# Patient Record
Sex: FEMALE | Race: WHITE | NOT HISPANIC OR LATINO | Employment: OTHER | URBAN - METROPOLITAN AREA
[De-identification: names, ages, dates, MRNs, and addresses within clinical notes are randomized per-mention and may not be internally consistent; named-entity substitution may affect disease eponyms.]

---

## 2018-01-10 NOTE — RESULT NOTES
Verified Results  (1) THIN PREP PAP WITH IMAGING [id number] Harriett Jefferson Order Number: [id number]     Test Name Result Flag Reference   LAB AP CASE REPORT (Report)     Gynecologic Cytology Report            Case: [id number]                  Authorizing Provider: ARIELA Elena    Collected:      12/08/2016 1028        First Screen:     TRACI Chavez    Received:      12/12/2016 1123        Specimen:  LIQUID-BASED PAP, SCREENING, Cervix, Endocervical   LAB AP GYN PRIMARY INTERPRETATION      Negative for intraepithelial lesion or malignancy  Electronically signed by TRACI Chavez on 12/13/2016 at 5:38 PM   LAB AP GYN SPECIMEN ADEQUACY      Satisfactory for evaluation  Endocervical/transformation zone component present  LAB AP GYN ADDITIONAL INFORMATION (Report)     [facility] FDA approved ,  and ThinPrep Imaging System are   utilized with strict adherence to the 's instruction manual to   prepare gynecologic and non-gynecologic cytology specimens for the   production of ThinPrep slides as well as for gynecologic ThinPrep imaging  These processes have been validated by our laboratory and/or by the     The Pap test is not a diagnostic procedure and should not be used as the   sole means to detect cervical cancer  It is only a screening procedure to   aid in the detection of cervical cancer and its precursors  Both   false-negative and false-positive results have been experienced  Your   patient's test result should be interpreted in this context together with   the history and clinical findings     LAB AP LMP      Performing Comments: CERVICAL - ENDOCERVICAL  ROUTINE PAP TEST   Performing Comments: CERVICAL - ENDOCERVICAL  ROUTINE PAP TEST

## 2018-02-26 ENCOUNTER — OFFICE VISIT (OUTPATIENT)
Dept: GYNECOLOGY | Facility: CLINIC | Age: 68
End: 2018-02-26
Payer: MEDICARE

## 2018-02-26 VITALS
WEIGHT: 154 LBS | BODY MASS INDEX: 27.29 KG/M2 | HEIGHT: 63 IN | DIASTOLIC BLOOD PRESSURE: 84 MMHG | SYSTOLIC BLOOD PRESSURE: 114 MMHG

## 2018-02-26 DIAGNOSIS — Z01.419 ENCOUNTER FOR GYNECOLOGICAL EXAMINATION WITHOUT ABNORMAL FINDING: Primary | ICD-10-CM

## 2018-02-26 DIAGNOSIS — Z78.0 MENOPAUSE: ICD-10-CM

## 2018-02-26 DIAGNOSIS — Z12.31 ENCOUNTER FOR SCREENING MAMMOGRAM FOR MALIGNANT NEOPLASM OF BREAST: ICD-10-CM

## 2018-02-26 PROCEDURE — G0101 CA SCREEN;PELVIC/BREAST EXAM: HCPCS | Performed by: NURSE PRACTITIONER

## 2018-02-26 RX ORDER — ZOLPIDEM TARTRATE 10 MG/1
1 TABLET ORAL
COMMUNITY
End: 2018-02-26 | Stop reason: SDUPTHER

## 2018-02-26 RX ORDER — ZOLPIDEM TARTRATE 10 MG/1
10 TABLET ORAL
Qty: 30 TABLET | Refills: 3 | Status: SHIPPED | OUTPATIENT
Start: 2018-02-26

## 2018-02-26 NOTE — PROGRESS NOTES
George Mills is a 69 y o  female who presents for annual exam  The patient is post menopausal and voices no complaints today  The patient is sexually active  GYN screening history: last pap: was normal and last mammogram: was normal  The patient is not taking hormone replacement therapy  Patient denies post-menopausal vaginal bleeding      The patient participates in regular exercise: yes         History of abnormal Pap smear: no  Family history of breast cancer: no  Past Medical History:   Diagnosis Date    Menopause     Osteoporosis      Family History   Problem Relation Age of Onset    Arthritis Mother     Diabetes Mother     Arthritis Father     Diabetes Father     Cataracts Brother      Past Surgical History:   Procedure Laterality Date    COLONOSCOPY      TONSILLECTOMY      TOOTH EXTRACTION       Social History     Social History    Marital status: /Civil Union     Spouse name: N/A    Number of children: N/A    Years of education: N/A     Occupational History    Not on file       Social History Main Topics    Smoking status: Never Smoker    Smokeless tobacco: Never Used    Alcohol use Yes      Comment: 2-3 times matthew    Drug use: No    Sexual activity: No     Other Topics Concern    Not on file     Social History Narrative    No narrative on file       Current Outpatient Prescriptions:     Multiple Vitamin (MULTI VITAMIN DAILY PO), Take by mouth, Disp: , Rfl:     zolpidem (AMBIEN) 10 mg tablet, Take 1 tablet (10 mg total) by mouth daily at bedtime as needed for sleep, Disp: 30 tablet, Rfl: 3      Review of Systems  Constitutional :no fever, feels well, no tiredness, no recent weight gain or loss  Cardiovascular: no complaints of slow or fast heart beat, no chest pain, no palpitations  Respiratory: no complaints of shortness of shortness of breath, no SCHREIBER  Breasts:no complaints of breast pain, breast lump, or nipple discharge  Gastrointestinal: no complaints of abdominal pain, constipation,nause, vomiting, or diarrhea or bloody stools  Genitourinary : no complaints of dysuria, incontinence, pelvic pain, dysmenorrhea,vaginal discharge or abnormal vaginal bleeding  Integumentary: no complaints of skin rash or lesion,itching or dry skin  Neurological: no complaints of headache,numbness, tingling, dizziness or fainting       Objective      /84 (BP Location: Right arm, Patient Position: Sitting, Cuff Size: Standard)   Ht 5' 2 5" (1 588 m)   Wt 69 9 kg (154 lb)   BMI 27 72 kg/m²     General:   appears stated age","cooperative","alert" normal mood and affect   Neck: Neck: normal, supple,trachea midline, no masses   Heart: regular rate and rhythm, S1, S2 normal, no murmur, click, rub or gallop   Lungs: clear to auscultation bilaterally"   Breasts: Breast exam :normal, no dimpling or skin changes noted   Abdomen: soft, non-tender, without masses or organomegaly   Vulva: Normal , no lesion   Vagina: normal , no lesions or dryness  Mild atrophy   Urethra: normal   Cervix: Normal, no palpable masses A pap smear was not done   Uterus: Normal , non-tender,not enlarged,no palpable masses   Adnexa: Normal, non-tender without fullness or masses                         Assessment     Normal GYN exam     Plan      All questions answered  Breast self exam technique reviewed and patient encouraged to perform self-exam monthly  Dietary diary  Dexa scan due 12/2018    Colonoscopy due this year

## 2018-09-14 DIAGNOSIS — R39.9 URINARY TRACT INFECTION SYMPTOMS: Primary | ICD-10-CM

## 2018-09-14 RX ORDER — NITROFURANTOIN 25; 75 MG/1; MG/1
100 CAPSULE ORAL 2 TIMES DAILY
Qty: 14 CAPSULE | Refills: 0 | Status: SHIPPED | OUTPATIENT
Start: 2018-09-14 | End: 2018-09-21

## 2019-03-08 DIAGNOSIS — Z12.31 ENCOUNTER FOR SCREENING MAMMOGRAM FOR MALIGNANT NEOPLASM OF BREAST: Primary | ICD-10-CM

## 2019-03-15 NOTE — PROGRESS NOTES
Assessment/Plan:    Benign findings on routine gyn exam  Recommended monthly SBE, annual CBE and annual screening mammo  DEXA scan script given  Discussed diagnosis of osteoporosis, risk of fracture and treatment with Prolia  Patient would like to wait for results of most DEXA before proceeding  Information sent to complete Cologuard  The patient denies STI risk factors and declines testing at this time  Reviewed diet/activity recommendations Calcium 2376-8178 mg and Vit D 800-1000 IU daily  Discussed postmenopausal considerations and symptoms to report  Discussed vaginal estrogen cream   Patient is going to think about it and notify the office if she would like to proceed  Diagnoses and all orders for this visit:    Encounter for gynecological examination (general) (routine) without abnormal findings    Encounter for screening for osteoporosis  -     DXA bone density spine hip and pelvis; Future    Menopause  -     DXA bone density spine hip and pelvis; Future        Subjective:      Patient ID: Wilfredo Montgomery is a 77 y o  female  This patient presents for routine annual gyn health maintenance exam   She denies acute gyn complaints  She denies  bleeding or spotting, VM sx, pelvic pain, dyspareunia except for occasional dryness  Denies breast concerns, abnormal discharge, bowel/bladder dysfunction   for 51 years and is sexually active  Denies STI concerns  No hx of STIs  Pap/HPV up to date and normal on 12/2016  Mammography up to date and normal - done at [facility]  2/26/18  Osteoporosis screening in 2016 revealed osteoporosis  Patient states she has declined treatment to date  Colonoscopy was 10 years ago per patient  She would like to proceed with Cologuard  She denies fam hx of colon cancer, inflammatory bowel disease or hx of colon polyps        The following portions of the patient's history were reviewed and updated as appropriate: allergies, current medications, past family history, past medical history, past social history, past surgical history and problem list     Review of Systems   Constitutional: Negative  Respiratory: Negative  Cardiovascular: Negative  Gastrointestinal: Negative  Endocrine: Negative  Genitourinary: Negative for dyspareunia (except for dryness), dysuria, frequency, pelvic pain, urgency, vaginal bleeding, vaginal discharge and vaginal pain  Musculoskeletal: Negative  Skin: Negative  Neurological: Negative  Psychiatric/Behavioral: Negative  Objective:      /90 (BP Location: Right arm)   Pulse 86   Ht 5' 2 5" (1 588 m)   Wt 69 9 kg (154 lb)   BMI 27 72 kg/m²          Physical Exam   Constitutional: She is oriented to person, place, and time  She appears well-developed and well-nourished  She is cooperative  HENT:   Head: Normocephalic and atraumatic  Neck: Normal range of motion  Neck supple  No thyroid mass and no thyromegaly present  Cardiovascular: Normal rate, regular rhythm and normal heart sounds  Pulmonary/Chest: Effort normal and breath sounds normal  Right breast exhibits no inverted nipple, no mass, no nipple discharge, no skin change and no tenderness  Left breast exhibits no inverted nipple, no mass, no nipple discharge, no skin change and no tenderness  No breast tenderness or discharge  Breasts are symmetrical    Abdominal: Soft  Normal appearance and bowel sounds are normal  There is no hepatosplenomegaly  There is no tenderness  Genitourinary: Vagina normal and uterus normal  No breast tenderness or discharge  Pelvic exam was performed with patient supine  There is no rash, tenderness or lesion on the right labia  There is no rash, tenderness or lesion on the left labia  Uterus is not enlarged and not tender  Cervix exhibits no motion tenderness, no discharge and no friability  Right adnexum displays no mass and no tenderness  Left adnexum displays no mass and no tenderness   No bleeding in the vagina  No vaginal discharge found  Genitourinary Comments: Mild vaginal atrophy   Musculoskeletal: Normal range of motion  Lymphadenopathy:     She has no axillary adenopathy  No inguinal adenopathy noted on the right or left side  Right: No inguinal adenopathy present  Left: No inguinal adenopathy present  Neurological: She is alert and oriented to person, place, and time  Skin: Skin is warm, dry and intact  Psychiatric: She has a normal mood and affect  Her speech is normal and behavior is normal  Cognition and memory are normal    Nursing note and vitals reviewed

## 2019-03-18 ENCOUNTER — ANNUAL EXAM (OUTPATIENT)
Dept: GYNECOLOGY | Facility: CLINIC | Age: 69
End: 2019-03-18
Payer: MEDICARE

## 2019-03-18 VITALS
HEIGHT: 63 IN | WEIGHT: 154 LBS | DIASTOLIC BLOOD PRESSURE: 90 MMHG | BODY MASS INDEX: 27.29 KG/M2 | HEART RATE: 86 BPM | SYSTOLIC BLOOD PRESSURE: 138 MMHG

## 2019-03-18 DIAGNOSIS — Z78.0 MENOPAUSE: ICD-10-CM

## 2019-03-18 DIAGNOSIS — Z13.820 ENCOUNTER FOR SCREENING FOR OSTEOPOROSIS: ICD-10-CM

## 2019-03-18 DIAGNOSIS — Z01.419 ENCOUNTER FOR GYNECOLOGICAL EXAMINATION (GENERAL) (ROUTINE) WITHOUT ABNORMAL FINDINGS: Primary | ICD-10-CM

## 2019-03-18 PROCEDURE — G0101 CA SCREEN;PELVIC/BREAST EXAM: HCPCS | Performed by: OBSTETRICS & GYNECOLOGY

## 2019-03-18 NOTE — PATIENT INSTRUCTIONS
Benign findings on routine gyn exam  Recommended monthly SBE, annual CBE and annual screening mammo  DEXA scan script given  Discussed diagnosis of osteoporosis, risk of fracture and treatment with Prolia  Patient would like to wait for results of most DEXA before proceeding  Information sent to complete Cologuard  The patient denies STI risk factors and declines testing at this time  Reviewed diet/activity recommendations Calcium 7186-5987 mg and Vit D 800-1000 IU daily  Discussed postmenopausal considerations and symptoms to report

## 2019-04-04 ENCOUNTER — TELEPHONE (OUTPATIENT)
Dept: GYNECOLOGY | Facility: CLINIC | Age: 69
End: 2019-04-04

## 2019-04-05 ENCOUNTER — TELEPHONE (OUTPATIENT)
Dept: GYNECOLOGY | Facility: CLINIC | Age: 69
End: 2019-04-05

## 2019-04-26 ENCOUNTER — TELEPHONE (OUTPATIENT)
Dept: GYNECOLOGY | Facility: CLINIC | Age: 69
End: 2019-04-26

## 2019-04-29 ENCOUNTER — TELEPHONE (OUTPATIENT)
Dept: GYNECOLOGY | Facility: CLINIC | Age: 69
End: 2019-04-29

## 2019-04-30 ENCOUNTER — TELEPHONE (OUTPATIENT)
Dept: GYNECOLOGY | Facility: CLINIC | Age: 69
End: 2019-04-30

## 2019-05-04 DIAGNOSIS — Z78.0 MENOPAUSE: ICD-10-CM

## 2019-05-04 DIAGNOSIS — Z13.820 ENCOUNTER FOR SCREENING FOR OSTEOPOROSIS: ICD-10-CM

## 2019-06-03 ENCOUNTER — EVALUATION (OUTPATIENT)
Dept: PHYSICAL THERAPY | Facility: CLINIC | Age: 69
End: 2019-06-03
Payer: MEDICARE

## 2019-06-03 DIAGNOSIS — M54.16 LUMBAR RADICULOPATHY: Primary | ICD-10-CM

## 2019-06-04 ENCOUNTER — TRANSCRIBE ORDERS (OUTPATIENT)
Dept: PHYSICAL THERAPY | Facility: CLINIC | Age: 69
End: 2019-06-04

## 2019-06-04 DIAGNOSIS — M54.16 LUMBAR RADICULOPATHY: Primary | ICD-10-CM

## 2019-06-04 PROCEDURE — 97161 PT EVAL LOW COMPLEX 20 MIN: CPT | Performed by: PHYSICAL THERAPIST

## 2019-06-07 ENCOUNTER — OFFICE VISIT (OUTPATIENT)
Dept: PHYSICAL THERAPY | Facility: CLINIC | Age: 69
End: 2019-06-07
Payer: MEDICARE

## 2019-06-07 DIAGNOSIS — M54.16 LUMBAR RADICULOPATHY: Primary | ICD-10-CM

## 2019-06-07 PROCEDURE — 97140 MANUAL THERAPY 1/> REGIONS: CPT

## 2019-06-07 PROCEDURE — 97110 THERAPEUTIC EXERCISES: CPT

## 2019-06-11 ENCOUNTER — OFFICE VISIT (OUTPATIENT)
Dept: PHYSICAL THERAPY | Facility: CLINIC | Age: 69
End: 2019-06-11
Payer: MEDICARE

## 2019-06-11 DIAGNOSIS — M54.16 LUMBAR RADICULOPATHY: Primary | ICD-10-CM

## 2019-06-11 PROCEDURE — 97110 THERAPEUTIC EXERCISES: CPT

## 2019-06-11 PROCEDURE — 97140 MANUAL THERAPY 1/> REGIONS: CPT

## 2019-06-14 ENCOUNTER — OFFICE VISIT (OUTPATIENT)
Dept: PHYSICAL THERAPY | Facility: CLINIC | Age: 69
End: 2019-06-14
Payer: MEDICARE

## 2019-06-14 DIAGNOSIS — M54.16 LUMBAR RADICULOPATHY: Primary | ICD-10-CM

## 2019-06-14 PROCEDURE — 97110 THERAPEUTIC EXERCISES: CPT

## 2019-06-19 ENCOUNTER — OFFICE VISIT (OUTPATIENT)
Dept: PHYSICAL THERAPY | Facility: CLINIC | Age: 69
End: 2019-06-19
Payer: MEDICARE

## 2019-06-19 DIAGNOSIS — M54.16 LUMBAR RADICULOPATHY: Primary | ICD-10-CM

## 2019-06-19 PROCEDURE — 97110 THERAPEUTIC EXERCISES: CPT

## 2019-06-21 ENCOUNTER — OFFICE VISIT (OUTPATIENT)
Dept: PHYSICAL THERAPY | Facility: CLINIC | Age: 69
End: 2019-06-21
Payer: MEDICARE

## 2019-06-21 DIAGNOSIS — M54.16 LUMBAR RADICULOPATHY: Primary | ICD-10-CM

## 2019-06-21 PROCEDURE — 97530 THERAPEUTIC ACTIVITIES: CPT

## 2019-06-21 PROCEDURE — 97110 THERAPEUTIC EXERCISES: CPT

## 2019-06-24 ENCOUNTER — OFFICE VISIT (OUTPATIENT)
Dept: PHYSICAL THERAPY | Facility: CLINIC | Age: 69
End: 2019-06-24
Payer: MEDICARE

## 2019-06-24 DIAGNOSIS — M54.16 LUMBAR RADICULOPATHY: Primary | ICD-10-CM

## 2019-06-24 PROCEDURE — 97110 THERAPEUTIC EXERCISES: CPT

## 2019-06-24 PROCEDURE — 97530 THERAPEUTIC ACTIVITIES: CPT

## 2019-06-24 PROCEDURE — 97140 MANUAL THERAPY 1/> REGIONS: CPT

## 2019-06-26 ENCOUNTER — APPOINTMENT (OUTPATIENT)
Dept: PHYSICAL THERAPY | Facility: CLINIC | Age: 69
End: 2019-06-26
Payer: MEDICARE

## 2019-07-01 ENCOUNTER — OFFICE VISIT (OUTPATIENT)
Dept: PHYSICAL THERAPY | Facility: CLINIC | Age: 69
End: 2019-07-01
Payer: MEDICARE

## 2019-07-01 DIAGNOSIS — M54.16 LUMBAR RADICULOPATHY: Primary | ICD-10-CM

## 2019-07-01 PROCEDURE — 97530 THERAPEUTIC ACTIVITIES: CPT

## 2019-07-01 PROCEDURE — 97110 THERAPEUTIC EXERCISES: CPT

## 2019-07-01 NOTE — PROGRESS NOTES
Daily Note     Today's date: 2019  Patient name: Ratna Camarena  : 1950  MRN: [id number]  Referring provider: Omi Leon DO  Dx:   Encounter Diagnosis     ICD-10-CM    1  Lumbar radiculopathy M54 16                 Subjective: Patient states, "It's actually feeling better "  Patient reports noticing less back pain when getting out of bed and when getting out of a chair - patient states, "It still hurts but it's not as intense as it was "  Patient reports she has been trying to lose weight  Objective: See treatment diary below       Precautions: OA, DDD    Daily Treatment Diary     Manuals       Lumbar pa's 4' 4' 4' 2' 4' 8' 2' KT                                             Exercise Diary              bike 8 min 8 min 8 min 10 min 10 min 10 min 10 min      ltr 5"x15 5"x15 5"x15 5"x15 5"x15 5"x15 5"x15      Prone press up 3x10 3x10 3x10 3x10 3x10 3x10 3x10      TaA 5"x15 5"x15 5"x15 5"x15 5"x15 5"x15 NP      TaA bridge   1x15     2x10 2x10      TaA bridge with clamshell  green  1x10 green  1x15 blue  1x20 blue  1x20 NP NP      TaA reverse clamshell level 2     1x20 1x20 NP      TaA pball wall squats 2x10 3x10 3x10 3x10 3x10 hold 2lb med ball  3x10 hold 2lb med ball  3x10      Step out with press   green  1x10 green  1x15 green  1x15 green  1x15 green  1x20      Prone hip ext 2x10 2x10 2x10 2x10 3x10 3x10 3x10      sktc manual 30"x4 manual 30"x4 self  30"x4 30"x4 NP NP NP      TaA LPD green  2x10 seated on pball green  3x10 standing  green  3x10 green  3x10 green  3x10 blue  3x10 blue  3x10      clamshell in sidelying red  2x10 green  2x10 green  2x10 blue  2x10 blue  3x10 blue  3x10 blue  3x10      rows seated on pball green  2x10 green  3x10 standing  green  3x10 green  3x10 green  3x10 30#  3x10 30#  3x10                                                                                                              Modalities             MHP prone post tx 10 min NP 10 min 10 min 10 min 10 min NP                     Assessment: No complaints reported throughout therapeutic exercise program   Good mobility noted throughout lumbar spine during joint mobilizations  Plan: Continue treatment as per PT plan of care

## 2019-07-03 ENCOUNTER — APPOINTMENT (OUTPATIENT)
Dept: PHYSICAL THERAPY | Facility: CLINIC | Age: 69
End: 2019-07-03
Payer: MEDICARE

## 2019-07-15 ENCOUNTER — OFFICE VISIT (OUTPATIENT)
Dept: PHYSICAL THERAPY | Facility: CLINIC | Age: 69
End: 2019-07-15
Payer: MEDICARE

## 2019-07-15 DIAGNOSIS — M54.16 LUMBAR RADICULOPATHY: Primary | ICD-10-CM

## 2019-07-15 PROCEDURE — 97112 NEUROMUSCULAR REEDUCATION: CPT | Performed by: PHYSICAL THERAPIST

## 2019-07-15 PROCEDURE — 97530 THERAPEUTIC ACTIVITIES: CPT | Performed by: PHYSICAL THERAPIST

## 2019-07-15 PROCEDURE — 97110 THERAPEUTIC EXERCISES: CPT | Performed by: PHYSICAL THERAPIST

## 2019-07-15 NOTE — PROGRESS NOTES
Daily Note     Today's date: 7/15/2019  Patient name: Reza Ricks  : 1950  MRN: [id number]  Referring provider: Yesenia Sherman DO  Dx:   Encounter Diagnosis     ICD-10-CM    1  Lumbar radiculopathy M54 16                 Subjective: Patient reports being sore this morning in her low back as she usually is first thing in the morning  Feels its a little worse today  Was walking a lot during her beach trip which felt like it helped  Objective: See treatment diary below       Precautions: OA, DDD    Daily Treatment Diary     Manuals 6/7 6/11 6/14 6/19 6/21 6/24 7/1 7/15     Lumbar pa's 4' 4' 4' 2' 4' 8' 2' KT P-A and SL rocking3' DD                                            Exercise Diary              bike 8 min 8 min 8 min 10 min 10 min 10 min 10 min 10min      ltr 5"x15 5"x15 5"x15 5"x15 5"x15 5"x15 5"x15 5"x15     Prone press up 3x10 3x10 3x10 3x10 3x10 3x10 3x10 2x10     TaA 5"x15 5"x15 5"x15 5"x15 5"x15 5"x15 NP      TaA bridge   1x15     2x10 2x10 2x10     TaA bridge with clamshell  green  1x10 green  1x15 blue  1x20 blue  1x20 NP NP      TaA reverse clamshell level 2     1x20 1x20 NP      TaA pball wall squats 2x10 3x10 3x10 3x10 3x10 hold 2lb med ball  3x10 hold 2lb med ball  3x10 hold 2lb med ball  3x10     Step out with press   green  1x10 green  1x15 green  1x15 green  1x15 green  1x20 green  1x20 ea     Prone hip ext 2x10 2x10 2x10 2x10 3x10 3x10 3x10 2x10     sktc manual 30"x4 manual 30"x4 self  30"x4 30"x4 NP NP NP self  30"x2     TaA LPD green  2x10 seated on pball green  3x10 standing  green  3x10 green  3x10 green  3x10 blue  3x10 blue  3x10 blue  3x10     clamshell in sidelying red  2x10 green  2x10 green  2x10 blue  2x10 blue  3x10 blue  3x10 blue  3x10 blue  3x10 ea     rows seated on pball green  2x10 green  3x10 standing  green  3x10 green  3x10 green  3x10 30#  3x10 30#  3x10 30#  3x10 Modalities             MHP prone post tx 10 min NP 10 min 10 min 10 min 10 min NP NP                    Assessment: Tolerated session fair  Had difficulty tolerating prone today so prone exercise volume was decreased, but tolerated other exercises well with minimal pain  Cues needed to perform exercises properly  Would benefit from continued PT  Plan: Continue treatment as per PT plan of care

## 2019-07-17 ENCOUNTER — APPOINTMENT (OUTPATIENT)
Dept: PHYSICAL THERAPY | Facility: CLINIC | Age: 69
End: 2019-07-17
Payer: MEDICARE

## 2021-03-30 DIAGNOSIS — Z23 ENCOUNTER FOR IMMUNIZATION: ICD-10-CM

## 2021-04-23 NOTE — PROGRESS NOTES
Assessment/Plan:    Recommended monthly SBE, annual CBE and annual screening mammo  ASCCP guidelines reviewed and pap with cotesting noted to be up to date; this low risk patient was advised she meets criteria to d/c pap screening at age 70  DEXA script given and will determine plan of care  If severe osteoporosis is noted, patient will need referral to rheumatology  Colonoscopy noted to be up to date  Patient will get records of negative colonoscopy sent to our office  Reviewed diet/activity recommendations Calcium 8492-7325 mg and Vit D 600-1000 IU daily  Discussed postmenopausal considerations and symptoms to report  Kegel exercises as instructed  RTO in one year for routine annual gyn exam or sooner PRN  Diagnoses and all orders for this visit:    Encounter for gynecological examination with Papanicolaou smear of cervix  -     Liquid-based pap, screening    Age-related osteoporosis without current pathological fracture  -     DXA bone density spine hip and pelvis; Future    Screening mammogram, encounter for  -     Mammo screening bilateral w 3d & cad; Future    Other orders  -     denosumab (PROLIA) 60 mg/mL; Inject 60 mg under the skin once        Subjective:      Patient ID: Alondra Estrada is a 65 y o  female  This patient presents for routine annual gyn exam   She denies acute gyn complaints  She denies  bleeding or spotting, VM sx, pelvic pain, dyspareunia, breast concerns, abnormal discharge, bowel/bladder dysfunction, depression/anx  , sexually active and is monogamous  Denies STI concerns  No hx of STIs  Pap/HPV normal 2016  Patient is agreeable to having last pap today  Mammography up to date and normal, 11/9/20  Osteoporosis screening up to date, 3/18/19 and patient is currently getting Prolia for osteoporosis but believes it makes her face itchy and would like to stop  Colonoscopy negative done at [facility] [address] , no records available   Done after a positive Cologuard in 2019  The following portions of the patient's history were reviewed and updated as appropriate: allergies, current medications, past family history, past medical history, past social history, past surgical history and problem list     Review of Systems   Constitutional: Negative  Respiratory: Negative  Cardiovascular: Negative  Gastrointestinal: Negative  Endocrine: Negative  Genitourinary: Negative for dyspareunia, dysuria, frequency, pelvic pain, urgency, vaginal bleeding, vaginal discharge and vaginal pain  Musculoskeletal: Negative  Skin: Negative  Neurological: Negative  Psychiatric/Behavioral: Negative  Objective:      /76 (BP Location: Right arm, Patient Position: Sitting, Cuff Size: Standard)   Pulse 89   Ht 5' 2 5" (1 588 m)   Wt 68 kg (150 lb)   BMI 27 00 kg/m²          Physical Exam  Vitals signs and nursing note reviewed  Exam conducted with a chaperone present  Constitutional:       Appearance: Normal appearance  She is well-developed  HENT:      Head: Normocephalic and atraumatic  Neck:      Musculoskeletal: Normal range of motion and neck supple  Thyroid: No thyroid mass or thyromegaly  Cardiovascular:      Rate and Rhythm: Normal rate and regular rhythm  Heart sounds: Normal heart sounds  Pulmonary:      Effort: Pulmonary effort is normal       Breath sounds: Normal breath sounds  Chest:      Breasts: Breasts are symmetrical          Right: No inverted nipple, mass, nipple discharge, skin change or tenderness  Left: No inverted nipple, mass, nipple discharge, skin change or tenderness  Abdominal:      General: Bowel sounds are normal       Palpations: Abdomen is soft  Tenderness: There is no abdominal tenderness  Hernia: There is no hernia in the left inguinal area or right inguinal area  Genitourinary:     General: Normal vulva  Exam position: Supine  Pubic Area: No rash         Labia: Right: No rash, tenderness, lesion or injury  Left: No rash, tenderness, lesion or injury  Urethra: No prolapse, urethral pain, urethral swelling or urethral lesion  Vagina: Normal  No signs of injury and foreign body  No vaginal discharge, erythema, tenderness, bleeding, lesions or prolapsed vaginal walls  Cervix: No cervical motion tenderness, discharge, friability, lesion, erythema, cervical bleeding or eversion  Uterus: Not deviated, not enlarged, not fixed, not tender and no uterine prolapse  Adnexa:         Right: No mass, tenderness or fullness  Left: No mass, tenderness or fullness  Rectum: No external hemorrhoid  Comments: Urethra normal without lesions  No bladder tenderness  Stenotic cervical os  Musculoskeletal: Normal range of motion  Lymphadenopathy:      Lower Body: No right inguinal adenopathy  No left inguinal adenopathy  Skin:     General: Skin is warm and dry  Neurological:      Mental Status: She is alert and oriented to person, place, and time  Psychiatric:         Speech: Speech normal          Behavior: Behavior normal  Behavior is cooperative

## 2021-04-26 ENCOUNTER — ANNUAL EXAM (OUTPATIENT)
Dept: GYNECOLOGY | Facility: CLINIC | Age: 71
End: 2021-04-26
Payer: MEDICARE

## 2021-04-26 VITALS
BODY MASS INDEX: 26.58 KG/M2 | HEART RATE: 89 BPM | SYSTOLIC BLOOD PRESSURE: 130 MMHG | HEIGHT: 63 IN | DIASTOLIC BLOOD PRESSURE: 76 MMHG | WEIGHT: 150 LBS

## 2021-04-26 DIAGNOSIS — Z01.419 ENCOUNTER FOR GYNECOLOGICAL EXAMINATION WITH PAPANICOLAOU SMEAR OF CERVIX: Primary | ICD-10-CM

## 2021-04-26 DIAGNOSIS — M81.0 AGE-RELATED OSTEOPOROSIS WITHOUT CURRENT PATHOLOGICAL FRACTURE: ICD-10-CM

## 2021-04-26 DIAGNOSIS — Z12.31 SCREENING MAMMOGRAM, ENCOUNTER FOR: ICD-10-CM

## 2021-04-26 PROCEDURE — G0145 SCR C/V CYTO,THINLAYER,RESCR: HCPCS | Performed by: OBSTETRICS & GYNECOLOGY

## 2021-04-26 PROCEDURE — G0101 CA SCREEN;PELVIC/BREAST EXAM: HCPCS | Performed by: OBSTETRICS & GYNECOLOGY

## 2021-04-29 LAB
LAB AP GYN PRIMARY INTERPRETATION: NORMAL
Lab: NORMAL

## 2021-05-19 ENCOUNTER — HOSPITAL ENCOUNTER (OUTPATIENT)
Dept: BONE DENSITY | Facility: MEDICAL CENTER | Age: 71
Discharge: HOME | End: 2021-05-19

## 2021-05-19 DIAGNOSIS — M81.0 AGE-RELATED OSTEOPOROSIS WITHOUT CURRENT PATHOLOGICAL FRACTURE: ICD-10-CM

## 2021-05-27 DIAGNOSIS — M81.0 AGE-RELATED OSTEOPOROSIS WITHOUT CURRENT PATHOLOGICAL FRACTURE: Primary | ICD-10-CM

## 2021-06-08 ENCOUNTER — TELEPHONE (OUTPATIENT)
Dept: GYNECOLOGY | Facility: CLINIC | Age: 71
End: 2021-06-08

## 2021-06-08 NOTE — TELEPHONE ENCOUNTER
Spoke with patient regarding osteoporosis on DXA  She had a reaction to prolia  Will refer to rheumatology for continued treatment

## 2021-06-15 ENCOUNTER — TELEPHONE (OUTPATIENT)
Dept: OBGYN CLINIC | Facility: HOSPITAL | Age: 71
End: 2021-06-15

## 2021-06-15 NOTE — TELEPHONE ENCOUNTER
Patient is requesting to be placed on a cancellation list for a sooner appointment  Patient is scheduled for 9/20 with Dr Jorden Morales      Call back # [phone]

## 2021-09-16 NOTE — PROGRESS NOTES
Assessment and Plan:   Patient is a 75-year-old female who presents for rheumatology consult for osteoporosis  She was previously on Prolia through her gynecologist and had a total of 4 injections, the last 1 was almost 8 months ago  Reports after the 4th injection she at some point after it developed itching on her scalp and face, which she did not have with the previous 3 injections  She was also on a trip in [place] at the time  It is not entirely clear if the itching was truly related to Prolia, especially since she did fine with the 3 previous injections  We discussed that she would be a candidate for alternative therapy like alendronate which we discussed the side effects including potential for reflux  We also discussed the rare risk of jaw osteonecrosis which she seems to have low risk for, no major dental issues or plans for surgeries  Patient was hesitant to go on any additional treatment but did wanted take a prescription for the alendronate with her in case she decides to go on treatment  She will get some routine osteoporosis labs today in the meantime  She wanted to hold off on a back x-ray until it gets worse and we discussed at that point she can see a spine doctor  If she decides to go on treatment she will let me know and then she will require an annual follow-up  She just had a DEXA in May this year so will not be due for a DEXA until summer of 2023  Plan:  Diagnoses and all orders for this visit:    Post-menopausal osteoporosis  -     Vitamin D 25 hydroxy  -     TSH, 3rd generation with Free T4 reflex  -     PTH, intact  -     Basic metabolic panel  -     alendronate (FOSAMAX) 70 mg tablet; Take 1 tablet once per week first thing in the morning on an empty stomach with a full glass of water and remain upright for at least 30 minutes following the dose      Vitamin D deficiency  -     Vitamin D 25 hydroxy  -     TSH, 3rd generation with Free T4 reflex  -     PTH, intact  -     Basic metabolic panel    Long term current use of therapeutic drug  -     Vitamin D 25 hydroxy  -     TSH, 3rd generation with Free T4 reflex  -     PTH, intact  -     Basic metabolic panel    Age-related osteoporosis without current pathological fracture  -     Ambulatory referral to Rheumatology  -     alendronate (FOSAMAX) 70 mg tablet; Take 1 tablet once per week first thing in the morning on an empty stomach with a full glass of water and remain upright for at least 30 minutes following the dose  Follow-up plan: 1 year if she goes on treatment with alendronate      TRINIDAD Goldstein is a 66 y o   female with lumbar radiculopathy, H/O basal cell carcinoma, who presents for rheumatology consult by request of [facility] for osteoporosis  Never saw Rheumatology in the past   Reports she was on the prolia for about 2 years, this was through gynecology  Reports that she did fine on the medication up until her 4th injection, which was almost 8 months ago  Reports she had itching all over her face and scalp after her 4th Prolia injection  However also reports at the same time she went on a trip to [place]  It is not clear how she relates the itching to Prolia but she seems to feel it is related since that was the only new thing for her, although she had not had any itching with the 3 previous injections  No other osteoporosis medications besides the Prolia  Reports she is not even sure she wants to go on a medication again because she is afraid of an reaction  No history of known fragility fractures  She has chronic lower back pain and thought this was from her osteoporosis and that treatment would improve her pain  She is not on any daily vitamin, just takes a multivitamin with calcium in it  No history of thyroid or parathyroid issues  No history of systemic steroid use  No known family history of osteoporosis or fragility fractures  No major issues with GERD        Review of Systems  Review of Systems   Constitutional: Negative for chills, fatigue, fever and unexpected weight change  HENT: Negative for mouth sores and trouble swallowing  Eyes: Negative for pain and visual disturbance  Respiratory: Negative for cough and shortness of breath  Cardiovascular: Negative for chest pain and leg swelling  Gastrointestinal: Negative for abdominal pain, blood in stool, constipation, diarrhea and nausea  Musculoskeletal: Positive for back pain  Negative for arthralgias, joint swelling and myalgias  Skin: Negative for color change and rash  Neurological: Negative for weakness and numbness  Hematological: Negative for adenopathy  Psychiatric/Behavioral: Negative for sleep disturbance  Allergies  No Known Allergies    Home Medications    Current Outpatient Medications:     Multiple Vitamin (MULTI VITAMIN DAILY PO), Take by mouth, Disp: , Rfl:     zolpidem (AMBIEN) 10 mg tablet, Take 1 tablet (10 mg total) by mouth daily at bedtime as needed for sleep, Disp: 30 tablet, Rfl: 3    alendronate (FOSAMAX) 70 mg tablet, Take 1 tablet once per week first thing in the morning on an empty stomach with a full glass of water and remain upright for at least 30 minutes following the dose , Disp: 13 tablet, Rfl: 3    denosumab (PROLIA) 60 mg/mL, Inject 60 mg under the skin once, Disp: , Rfl:     Past Medical History  Past Medical History:   Diagnosis Date    Basal cell carcinoma     Menopause     Osteoporosis        Past Surgical History   Past Surgical History:   Procedure Laterality Date    BASAL CELL CARCINOMA EXCISION      COLONOSCOPY      TONSILLECTOMY      TOOTH EXTRACTION         Family History  No known family history of autoimmune or inflammatory diseases      Family History   Problem Relation Age of Onset    Arthritis Mother     Diabetes Mother     Arthritis Father     Diabetes Father     Cataracts Brother        Social History  Social History     Substance and Sexual Activity   Alcohol Use Yes    Comment: 2-3 times weekly     Social History     Substance and Sexual Activity   Drug Use Never     Social History     Tobacco Use   Smoking Status Never Smoker   Smokeless Tobacco Never Used       Objective:    Vitals:    09/20/21 0843   BP: 126/70   Pulse: 82   Weight: 68 4 kg (150 lb 12 8 oz)   Height: 5' 2 5" (1 588 m)       Physical Exam  Constitutional:       General: She is not in acute distress  Appearance: She is well-developed  HENT:      Head: Normocephalic and atraumatic  Eyes:      General: Lids are normal  No scleral icterus  Conjunctiva/sclera: Conjunctivae normal    Pulmonary:      Effort: Pulmonary effort is normal  No tachypnea, accessory muscle usage or respiratory distress  Musculoskeletal:      Cervical back: Neck supple  Skin:     General: Skin is dry  Findings: No rash  Neurological:      Mental Status: She is alert  Psychiatric:         Behavior: Behavior normal  Behavior is cooperative  Imaging:   DEXA 5/26/21:  Formatting of this note might be different from the original    DEXA scan     Indication: Evaluate for osteoporosis  68-year-old female  Technique:   A DEXA scan was performed using a [employer] Advance   densitometer  Assessment for L1-L4 and both hips    Detailed printout of the   exam is enclosed  Comparison: None  Findings: For the 9 anatomic areas evaluated there were 2 osteoporotic T-scores,   6 osteopenic T-scores and 1 unremarkable T score  Consequently with osteoporotic T scores present, this would suggest severe   increased risk of fracture  Specifically the 2 osteoporotic T-scores were obtained at the L1 and L2   vertebral bodies, with -2 5 and -2 6  Additional lumbar osteopenic T-scores for the L3 vertebra and total left Hip,   with these T scores -2 2 and -1 9  The only unremarkable T score was obtained at   the L4 vertebra with T score -0 9       Both hips show osteopenic T-scores  Specifically, the right hip-Total and   hip-Neck: -1 9 and -2 2, respectively  For the contralateral hip-Total and   hip-Neck: -1 3 and -2 0  The FRAX estimate for 10 year probability of fracture:   Major osteoporotic   fracture: 14 1 %      Hip fracture: 3 5 %  (Additional risk factor in this patient is secondary osteoporosis )     The actual bone mineral densities were as follows for the Total lumbar, Total   right hip and Total left hip: 0 962, 0 771 and 0 838 gram/centimeter-squared  IMPRESSION:   Impression:   For the 9 anatomic areas evaluated there were 2 osteoporotic   T-scores, 6 osteopenic T-scores and 1 unremarkable T score  Consequently with osteoporotic T scores present, this would suggest severe   increased risk of fracture  Labs:    Ref Range & Units 5/14/19  9:13 AM   Vitamin D, 25-OH 30 - 100 ng/mL 42       Ref Range & Units 5/14/19  9:13 AM   Hemoglobin 11 5 - 14 5 g/dL 14 7High         Hematocrit 35 0 - 43 0 % 43 0        WBC 4 0 - 10 0 thou/cmm 4 1        RBC 3 70 - 4 70 mill/cmm 4 47        Platelet Count 906 - 350 thou/cmm 168        MPV 7 5 - 11 3 fL 9 6        MCV 80 - 100 fL 96        MCH 26 - 34 pg 32 8        MCHC 32 - 37 g/dL 34 2        RDW 12 0 - 16 0 % 12 8       Ref Range & Units 5/14/19  9:13 AM   Thyroid Stimulating Hormone 0 36 - 3 74 uIU/mL 4  78High        Ref Range & Units 5/14/19  9:13 AM   Parathyroid Hormone, Intact 18 5 - 88 0 pg/mL 35 8       Ref Range & Units 5/14/19  9:13 AM   Glucose 65 - 99 mg/dL 82        BUN 7 - 25 mg/dL 20        Creatinine 0 40 - 1 10 mg/dL 0 92        Sodium 135 - 145 mmol/L 142        Potassium 3 5 - 5 2 mmol/L 4 2        Chloride 100 - 109 mmol/L 107        Carbon Dioxide 23 - 31 mmol/L 27        Calcium 8 5 - 10 1 mg/dL 9 3        Alkaline Phosphatase 35 - 120 U/L 70        Albumin 3 5 - 4 8 g/dL 4 0        Bilirubin, Total 0 2 - 1 0 mg/dL 0 6        Protein, Total 6 3 - 8 3 g/dL 7 3        AST <41 U/L 14        ALT <56 U/L 17        Anion Gap 3 - 11  8        GFR, Calculated >60 mL/min/1 73m2 63    Comment: mL/min per 1 73 square meters

## 2021-09-20 ENCOUNTER — OFFICE VISIT (OUTPATIENT)
Dept: RHEUMATOLOGY | Facility: CLINIC | Age: 71
End: 2021-09-20
Payer: MEDICARE

## 2021-09-20 ENCOUNTER — APPOINTMENT (OUTPATIENT)
Dept: LAB | Facility: CLINIC | Age: 71
End: 2021-09-20
Payer: MEDICARE

## 2021-09-20 VITALS
WEIGHT: 150.8 LBS | SYSTOLIC BLOOD PRESSURE: 126 MMHG | HEART RATE: 82 BPM | DIASTOLIC BLOOD PRESSURE: 70 MMHG | HEIGHT: 63 IN | BODY MASS INDEX: 26.72 KG/M2

## 2021-09-20 DIAGNOSIS — Z79.899 LONG TERM CURRENT USE OF THERAPEUTIC DRUG: ICD-10-CM

## 2021-09-20 DIAGNOSIS — M81.0 POST-MENOPAUSAL OSTEOPOROSIS: Primary | ICD-10-CM

## 2021-09-20 DIAGNOSIS — M81.0 AGE-RELATED OSTEOPOROSIS WITHOUT CURRENT PATHOLOGICAL FRACTURE: ICD-10-CM

## 2021-09-20 DIAGNOSIS — E55.9 VITAMIN D DEFICIENCY: ICD-10-CM

## 2021-09-20 LAB
25(OH)D3 SERPL-MCNC: 46.9 NG/ML (ref 30–100)
ANION GAP SERPL CALCULATED.3IONS-SCNC: 6 MMOL/L (ref 4–13)
BUN SERPL-MCNC: 20 MG/DL (ref 5–25)
CALCIUM SERPL-MCNC: 9.6 MG/DL (ref 8.3–10.1)
CHLORIDE SERPL-SCNC: 106 MMOL/L (ref 100–108)
CO2 SERPL-SCNC: 30 MMOL/L (ref 21–32)
CREAT SERPL-MCNC: 0.95 MG/DL (ref 0.6–1.3)
GFR SERPL CREATININE-BSD FRML MDRD: 60 ML/MIN/1.73SQ M
GLUCOSE P FAST SERPL-MCNC: 93 MG/DL (ref 65–99)
POTASSIUM SERPL-SCNC: 5.1 MMOL/L (ref 3.5–5.3)
PTH-INTACT SERPL-MCNC: 62.7 PG/ML (ref 18.4–80.1)
SODIUM SERPL-SCNC: 142 MMOL/L (ref 136–145)
T4 FREE SERPL-MCNC: 1.09 NG/DL (ref 0.76–1.46)
TSH SERPL DL<=0.05 MIU/L-ACNC: 5.42 UIU/ML (ref 0.36–3.74)

## 2021-09-20 PROCEDURE — 83970 ASSAY OF PARATHORMONE: CPT | Performed by: INTERNAL MEDICINE

## 2021-09-20 PROCEDURE — 82306 VITAMIN D 25 HYDROXY: CPT | Performed by: INTERNAL MEDICINE

## 2021-09-20 PROCEDURE — 84439 ASSAY OF FREE THYROXINE: CPT | Performed by: INTERNAL MEDICINE

## 2021-09-20 PROCEDURE — 84443 ASSAY THYROID STIM HORMONE: CPT | Performed by: INTERNAL MEDICINE

## 2021-09-20 PROCEDURE — 99204 OFFICE O/P NEW MOD 45 MIN: CPT | Performed by: INTERNAL MEDICINE

## 2021-09-20 PROCEDURE — 80048 BASIC METABOLIC PNL TOTAL CA: CPT | Performed by: INTERNAL MEDICINE

## 2021-09-20 PROCEDURE — 36415 COLL VENOUS BLD VENIPUNCTURE: CPT | Performed by: INTERNAL MEDICINE

## 2021-09-20 RX ORDER — ALENDRONATE SODIUM 70 MG/1
TABLET ORAL
Qty: 13 TABLET | Refills: 3 | Status: SHIPPED | OUTPATIENT
Start: 2021-09-20

## 2022-08-02 ENCOUNTER — APPOINTMENT (OUTPATIENT)
Dept: LAB | Facility: CLINIC | Age: 72
End: 2022-08-02
Payer: MEDICARE

## 2022-08-02 DIAGNOSIS — R94.6 NONSPECIFIC ABNORMAL RESULTS OF THYROID FUNCTION STUDY: ICD-10-CM

## 2022-08-02 DIAGNOSIS — M81.0 SENILE OSTEOPOROSIS: ICD-10-CM

## 2022-08-02 DIAGNOSIS — E78.2 MIXED HYPERLIPIDEMIA: ICD-10-CM

## 2022-08-02 DIAGNOSIS — Z00.00 ROUTINE GENERAL MEDICAL EXAMINATION AT A HEALTH CARE FACILITY: ICD-10-CM

## 2022-08-02 LAB
ALBUMIN SERPL BCP-MCNC: 3.6 G/DL (ref 3.5–5)
ALP SERPL-CCNC: 78 U/L (ref 46–116)
ALT SERPL W P-5'-P-CCNC: 19 U/L (ref 12–78)
ANION GAP SERPL CALCULATED.3IONS-SCNC: 3 MMOL/L (ref 4–13)
AST SERPL W P-5'-P-CCNC: 14 U/L (ref 5–45)
BILIRUB SERPL-MCNC: 0.6 MG/DL (ref 0.2–1)
BUN SERPL-MCNC: 15 MG/DL (ref 5–25)
CALCIUM SERPL-MCNC: 9.8 MG/DL (ref 8.3–10.1)
CHLORIDE SERPL-SCNC: 109 MMOL/L (ref 96–108)
CHOLEST SERPL-MCNC: 245 MG/DL
CO2 SERPL-SCNC: 27 MMOL/L (ref 21–32)
CREAT SERPL-MCNC: 0.94 MG/DL (ref 0.6–1.3)
GFR SERPL CREATININE-BSD FRML MDRD: 59 ML/MIN/1.73SQ M
GLUCOSE P FAST SERPL-MCNC: 90 MG/DL (ref 65–99)
HDLC SERPL-MCNC: 80 MG/DL
LDLC SERPL CALC-MCNC: 124 MG/DL (ref 0–100)
NONHDLC SERPL-MCNC: 165 MG/DL
POTASSIUM SERPL-SCNC: 5 MMOL/L (ref 3.5–5.3)
PROT SERPL-MCNC: 7.5 G/DL (ref 6.4–8.4)
SODIUM SERPL-SCNC: 139 MMOL/L (ref 135–147)
TRIGL SERPL-MCNC: 204 MG/DL
TSH SERPL DL<=0.05 MIU/L-ACNC: 7.3 UIU/ML (ref 0.45–4.5)

## 2022-08-02 PROCEDURE — 80061 LIPID PANEL: CPT

## 2022-08-02 PROCEDURE — 84443 ASSAY THYROID STIM HORMONE: CPT

## 2022-08-02 PROCEDURE — 80053 COMPREHEN METABOLIC PANEL: CPT

## 2023-08-24 ENCOUNTER — APPOINTMENT (OUTPATIENT)
Dept: LAB | Facility: CLINIC | Age: 73
End: 2023-08-24
Payer: MEDICARE

## 2023-08-24 DIAGNOSIS — Z00.00 ROUTINE GENERAL MEDICAL EXAMINATION AT A HEALTH CARE FACILITY: ICD-10-CM

## 2023-08-24 DIAGNOSIS — M81.0 SENILE OSTEOPOROSIS: ICD-10-CM

## 2023-08-24 DIAGNOSIS — E78.2 MIXED HYPERLIPIDEMIA: ICD-10-CM

## 2023-08-24 DIAGNOSIS — R94.6 NONSPECIFIC ABNORMAL RESULTS OF THYROID FUNCTION STUDY: ICD-10-CM

## 2023-08-24 LAB
ALBUMIN SERPL BCP-MCNC: 4.4 G/DL (ref 3.5–5)
ALP SERPL-CCNC: 67 U/L (ref 34–104)
ALT SERPL W P-5'-P-CCNC: 16 U/L (ref 7–52)
ANION GAP SERPL CALCULATED.3IONS-SCNC: 10 MMOL/L
AST SERPL W P-5'-P-CCNC: 21 U/L (ref 13–39)
BILIRUB SERPL-MCNC: 0.71 MG/DL (ref 0.2–1)
BUN SERPL-MCNC: 17 MG/DL (ref 5–25)
CALCIUM SERPL-MCNC: 9.6 MG/DL (ref 8.4–10.2)
CHLORIDE SERPL-SCNC: 105 MMOL/L (ref 96–108)
CHOLEST SERPL-MCNC: 279 MG/DL
CO2 SERPL-SCNC: 26 MMOL/L (ref 21–32)
CREAT SERPL-MCNC: 0.88 MG/DL (ref 0.6–1.3)
ERYTHROCYTE [DISTWIDTH] IN BLOOD BY AUTOMATED COUNT: 12.9 % (ref 11.6–15.1)
GFR SERPL CREATININE-BSD FRML MDRD: 64 ML/MIN/1.73SQ M
GLUCOSE P FAST SERPL-MCNC: 89 MG/DL (ref 65–99)
HCT VFR BLD AUTO: 45.5 % (ref 34.8–46.1)
HDLC SERPL-MCNC: 92 MG/DL
HGB BLD-MCNC: 14.9 G/DL (ref 11.5–15.4)
LDLC SERPL CALC-MCNC: 159 MG/DL (ref 0–100)
MCH RBC QN AUTO: 31.6 PG (ref 26.8–34.3)
MCHC RBC AUTO-ENTMCNC: 32.7 G/DL (ref 31.4–37.4)
MCV RBC AUTO: 96 FL (ref 82–98)
NONHDLC SERPL-MCNC: 187 MG/DL
PLATELET # BLD AUTO: 200 THOUSANDS/UL (ref 149–390)
PMV BLD AUTO: 11.1 FL (ref 8.9–12.7)
POTASSIUM SERPL-SCNC: 5 MMOL/L (ref 3.5–5.3)
PROT SERPL-MCNC: 7.6 G/DL (ref 6.4–8.4)
RBC # BLD AUTO: 4.72 MILLION/UL (ref 3.81–5.12)
SODIUM SERPL-SCNC: 141 MMOL/L (ref 135–147)
TRIGL SERPL-MCNC: 139 MG/DL
TSH SERPL DL<=0.05 MIU/L-ACNC: 6.84 UIU/ML (ref 0.45–4.5)
WBC # BLD AUTO: 4 THOUSAND/UL (ref 4.31–10.16)

## 2023-08-24 PROCEDURE — 36415 COLL VENOUS BLD VENIPUNCTURE: CPT

## 2023-08-24 PROCEDURE — 80061 LIPID PANEL: CPT

## 2023-08-24 PROCEDURE — 85027 COMPLETE CBC AUTOMATED: CPT

## 2023-08-24 PROCEDURE — 84443 ASSAY THYROID STIM HORMONE: CPT

## 2023-08-24 PROCEDURE — 80053 COMPREHEN METABOLIC PANEL: CPT

## 2024-04-22 ENCOUNTER — APPOINTMENT (OUTPATIENT)
Dept: LAB | Facility: CLINIC | Age: 74
End: 2024-04-22
Payer: MEDICARE

## 2024-04-22 ENCOUNTER — APPOINTMENT (OUTPATIENT)
Dept: RADIOLOGY | Facility: CLINIC | Age: 74
End: 2024-04-22
Payer: MEDICARE

## 2024-04-22 DIAGNOSIS — M81.0 SENILE OSTEOPOROSIS: ICD-10-CM

## 2024-04-22 DIAGNOSIS — E03.9 ACQUIRED HYPOTHYROIDISM: ICD-10-CM

## 2024-04-22 LAB
25(OH)D3 SERPL-MCNC: 75.6 NG/ML (ref 30–100)
GLIADIN PEPTIDE IGA SER-ACNC: 25.7 U/ML
GLIADIN PEPTIDE IGA SER-ACNC: POSITIVE
GLIADIN PEPTIDE IGG SER-ACNC: <0.4 U/ML
GLIADIN PEPTIDE IGG SER-ACNC: NEGATIVE
PTH-INTACT SERPL-MCNC: 56.2 PG/ML (ref 12–88)
T4 FREE SERPL-MCNC: 0.72 NG/DL (ref 0.61–1.12)
TSH SERPL DL<=0.05 MIU/L-ACNC: 10.74 UIU/ML (ref 0.45–4.5)
TTG IGA SER-ACNC: <0.5 U/ML
TTG IGA SER-ACNC: NEGATIVE
TTG IGG SER-ACNC: <0.8 U/ML
TTG IGG SER-ACNC: NEGATIVE

## 2024-04-22 PROCEDURE — 36415 COLL VENOUS BLD VENIPUNCTURE: CPT

## 2024-04-22 PROCEDURE — 84165 PROTEIN E-PHORESIS SERUM: CPT

## 2024-04-22 PROCEDURE — 82523 COLLAGEN CROSSLINKS: CPT

## 2024-04-22 PROCEDURE — 84443 ASSAY THYROID STIM HORMONE: CPT

## 2024-04-22 PROCEDURE — 86364 TISS TRNSGLTMNASE EA IG CLAS: CPT

## 2024-04-22 PROCEDURE — 80053 COMPREHEN METABOLIC PANEL: CPT

## 2024-04-22 PROCEDURE — 84439 ASSAY OF FREE THYROXINE: CPT

## 2024-04-22 PROCEDURE — 82784 ASSAY IGA/IGD/IGG/IGM EACH: CPT

## 2024-04-22 PROCEDURE — 82306 VITAMIN D 25 HYDROXY: CPT

## 2024-04-22 PROCEDURE — 72100 X-RAY EXAM L-S SPINE 2/3 VWS: CPT

## 2024-04-22 PROCEDURE — 86258 DGP ANTIBODY EACH IG CLASS: CPT

## 2024-04-22 PROCEDURE — 84080 ASSAY ALKALINE PHOSPHATASES: CPT

## 2024-04-22 PROCEDURE — 83970 ASSAY OF PARATHORMONE: CPT

## 2024-04-23 LAB
ALBUMIN SERPL BCP-MCNC: 4.5 G/DL (ref 3.5–5)
ALBUMIN SERPL ELPH-MCNC: 4.37 G/DL (ref 3.2–5.1)
ALBUMIN SERPL ELPH-MCNC: 60.7 % (ref 48–70)
ALP SERPL-CCNC: 62 U/L (ref 34–104)
ALPHA1 GLOB SERPL ELPH-MCNC: 0.27 G/DL (ref 0.15–0.47)
ALPHA1 GLOB SERPL ELPH-MCNC: 3.7 % (ref 1.8–7)
ALPHA2 GLOB SERPL ELPH-MCNC: 0.64 G/DL (ref 0.42–1.04)
ALPHA2 GLOB SERPL ELPH-MCNC: 8.9 % (ref 5.9–14.9)
ALT SERPL W P-5'-P-CCNC: 24 U/L (ref 7–52)
ANION GAP SERPL CALCULATED.3IONS-SCNC: 8 MMOL/L (ref 4–13)
AST SERPL W P-5'-P-CCNC: 24 U/L (ref 13–39)
BETA GLOB ABNORMAL SERPL ELPH-MCNC: 0.44 G/DL (ref 0.31–0.57)
BETA1 GLOB SERPL ELPH-MCNC: 6.1 % (ref 4.7–7.7)
BETA2 GLOB SERPL ELPH-MCNC: 5.5 % (ref 3.1–7.9)
BETA2+GAMMA GLOB SERPL ELPH-MCNC: 0.4 G/DL (ref 0.2–0.58)
BILIRUB SERPL-MCNC: 0.7 MG/DL (ref 0.2–1)
BUN SERPL-MCNC: 20 MG/DL (ref 5–25)
CALCIUM SERPL-MCNC: 9.7 MG/DL (ref 8.4–10.2)
CHLORIDE SERPL-SCNC: 104 MMOL/L (ref 96–108)
CO2 SERPL-SCNC: 28 MMOL/L (ref 21–32)
CREAT SERPL-MCNC: 0.93 MG/DL (ref 0.6–1.3)
GAMMA GLOB ABNORMAL SERPL ELPH-MCNC: 1.09 G/DL (ref 0.4–1.66)
GAMMA GLOB SERPL ELPH-MCNC: 15.1 % (ref 6.9–22.3)
GFR SERPL CREATININE-BSD FRML MDRD: 60 ML/MIN/1.73SQ M
GLUCOSE P FAST SERPL-MCNC: 86 MG/DL (ref 65–99)
IGA SERPL-MCNC: 264 MG/DL (ref 66–433)
IGG/ALB SER: 1.54 {RATIO} (ref 1.1–1.8)
POTASSIUM SERPL-SCNC: 4.3 MMOL/L (ref 3.5–5.3)
PROT PATTERN SERPL ELPH-IMP: NORMAL
PROT SERPL-MCNC: 7.2 G/DL (ref 6.4–8.2)
PROT SERPL-MCNC: 7.6 G/DL (ref 6.4–8.4)
SODIUM SERPL-SCNC: 140 MMOL/L (ref 135–147)

## 2024-04-23 PROCEDURE — 84165 PROTEIN E-PHORESIS SERUM: CPT | Performed by: PATHOLOGY

## 2024-04-25 LAB — ALP BONE SERPL-MCNC: 10.8 UG/L

## 2024-04-26 LAB — COLLAGEN CTX SERPL-MCNC: 522 PG/ML
